# Patient Record
Sex: MALE | Race: BLACK OR AFRICAN AMERICAN | NOT HISPANIC OR LATINO | Employment: OTHER | URBAN - NONMETROPOLITAN AREA
[De-identification: names, ages, dates, MRNs, and addresses within clinical notes are randomized per-mention and may not be internally consistent; named-entity substitution may affect disease eponyms.]

---

## 2019-09-09 PROBLEM — C61 PROSTATE CANCER: Status: ACTIVE | Noted: 2019-09-09

## 2019-10-03 PROBLEM — R50.82 POST-PROCEDURAL FEVER: Status: ACTIVE | Noted: 2019-10-03

## 2019-10-03 PROBLEM — R61 DIAPHORESIS: Status: ACTIVE | Noted: 2019-10-03

## 2019-10-03 PROBLEM — R06.02 SHORTNESS OF BREATH: Status: ACTIVE | Noted: 2019-10-03

## 2019-10-05 PROBLEM — R10.9 ABDOMINAL PAIN: Status: ACTIVE | Noted: 2019-10-05

## 2019-10-07 PROBLEM — R63.8 INADEQUATE ORAL INTAKE: Status: ACTIVE | Noted: 2019-10-07

## 2019-10-09 PROBLEM — K56.7 ILEUS: Status: ACTIVE | Noted: 2019-10-09

## 2019-10-09 PROBLEM — D72.829 LEUKOCYTOSIS: Status: ACTIVE | Noted: 2019-10-09

## 2019-10-10 PROBLEM — K56.609 COLONIC OBSTRUCTION: Status: ACTIVE | Noted: 2019-10-10

## 2019-10-11 PROBLEM — D62 ACUTE BLOOD LOSS ANEMIA: Status: ACTIVE | Noted: 2019-10-11

## 2019-10-14 PROBLEM — E87.6 HYPOKALEMIA: Status: ACTIVE | Noted: 2019-10-14

## 2020-11-12 DIAGNOSIS — U07.1 COVID-19 VIRUS DETECTED: ICD-10-CM

## 2020-11-23 PROBLEM — Z12.11 SCREEN FOR COLON CANCER: Status: ACTIVE | Noted: 2020-11-23

## 2022-05-12 ENCOUNTER — HOSPITAL ENCOUNTER (EMERGENCY)
Facility: HOSPITAL | Age: 62
Discharge: HOME | End: 2022-05-12
Attending: EMERGENCY MEDICINE
Payer: COMMERCIAL

## 2022-05-12 VITALS
RESPIRATION RATE: 18 BRPM | DIASTOLIC BLOOD PRESSURE: 79 MMHG | SYSTOLIC BLOOD PRESSURE: 161 MMHG | OXYGEN SATURATION: 98 % | HEART RATE: 95 BPM | WEIGHT: 233 LBS | TEMPERATURE: 98 F | HEIGHT: 72 IN | BODY MASS INDEX: 31.56 KG/M2

## 2022-05-12 DIAGNOSIS — R31.0 GROSS HEMATURIA: Primary | ICD-10-CM

## 2022-05-12 LAB
BACTERIA #/AREA URNS HPF: ABNORMAL /HPF
BILIRUB UR QL STRIP: NEGATIVE
CLARITY UR: ABNORMAL
COLOR UR: ABNORMAL
GLUCOSE UR QL STRIP: NEGATIVE
HGB UR QL STRIP: ABNORMAL
HYALINE CASTS #/AREA URNS LPF: 0 /LPF
KETONES UR QL STRIP: ABNORMAL
LEUKOCYTE ESTERASE UR QL STRIP: ABNORMAL
MICROSCOPIC COMMENT: ABNORMAL
NITRITE UR QL STRIP: NEGATIVE
PH UR STRIP: 6 [PH] (ref 5–8)
PROT UR QL STRIP: ABNORMAL
RBC #/AREA URNS HPF: >100 /HPF (ref 0–4)
SP GR UR STRIP: 1.03 (ref 1–1.03)
SQUAMOUS #/AREA URNS HPF: 0 /HPF
URN SPEC COLLECT METH UR: ABNORMAL
UROBILINOGEN UR STRIP-ACNC: 1 EU/DL
WBC #/AREA URNS HPF: >100 /HPF (ref 0–5)

## 2022-05-12 PROCEDURE — 87086 URINE CULTURE/COLONY COUNT: CPT | Performed by: EMERGENCY MEDICINE

## 2022-05-12 PROCEDURE — 81000 URINALYSIS NONAUTO W/SCOPE: CPT | Performed by: EMERGENCY MEDICINE

## 2022-05-12 PROCEDURE — 99283 EMERGENCY DEPT VISIT LOW MDM: CPT | Mod: 25

## 2022-05-12 RX ORDER — LISINOPRIL 10 MG/1
10 TABLET ORAL DAILY
COMMUNITY
End: 2022-05-23

## 2022-05-12 NOTE — ED PROVIDER NOTES
EMERGENCY DEPARTMENT HISTORY AND PHYSICAL EXAM          Date: 5/12/2022   Patient Name: Yeyo Chandra       History of Presenting Illness           Chief Complaint   Patient presents with    Hematuria     Pt stated he has had several instances over the last few months of having blood in his urine. Pt advised he had his prostate removed 2 years ago due to cancer. Pt began urinating blood again last night. His PCP stated they could not find a reason.          History Provided By: Patient    0150   Yeyo Chandra is a 62 y.o. male with PMHX of hypertension, prostate cancer status post XRT and prostatectomy who presents to the emergency department C/O painless hematuria.    Patient reports gross hematuria that is intermittent.  States that happens about once a month.  Saw his urologist  about this couple years ago and resolved.  States when happened a few months ago he was diagnosed with a UTI however antibiotics and not really seem to resolve it.  States there is bloody clots in his urine.    Patient was previously on Eliquis for PE he is no longer taking it after it was discontinued by his physician    PCP: Nneka Willingham MD        No current facility-administered medications for this encounter.     Current Outpatient Medications   Medication Sig Dispense Refill    apixaban (ELIQUIS) 5 mg Tab Take 5 mg by mouth 2 (two) times daily.      lisinopriL 10 MG tablet Take 10 mg by mouth once daily.      lisinopriL-hydrochlorothiazide (PRINZIDE,ZESTORETIC) 20-12.5 mg per tablet Take 1 tablet by mouth once daily.      oxybutynin (DITROPAN-XL) 10 MG 24 hr tablet Take 10 mg by mouth once daily.       Facility-Administered Medications Ordered in Other Encounters   Medication Dose Route Frequency Provider Last Rate Last Admin    0.9%  NaCl infusion   Intravenous Continuous Ramirez Pappas MD   Stopped at 11/23/20 0848           Past History     Past Medical History:   Past Medical History:   Diagnosis Date    Anemia      Cancer     prostate    Colonic stricture     required colon decompression    Encounter for blood transfusion     Hypertension     Leukocytosis     Pulmonary embolism         Past Surgical History:   Past Surgical History:   Procedure Laterality Date    COLON DECOMPRESSION      COLONOSCOPY N/A 10/10/2019    Procedure: COLONOSCOPY WITH COLON DECOMPRESSION;  Surgeon: Ramirez aPppas MD;  [facility];  Service: Endoscopy;  Laterality: N/A;    COLONOSCOPY N/A 11/23/2020    Procedure: COLONOSCOPY;  Surgeon: Ramirez Pappas MD;  [facility];  Service: Endoscopy;  Laterality: N/A;    CYSTOGRAM N/A 10/4/2019    Procedure: CYSTOGRAM;  Surgeon: Devang Lewis MD;  [facility];  Service: Urology;  Laterality: N/A;    CYSTOSCOPY N/A 10/4/2019    Procedure: CYSTOSCOPY;  Surgeon: Devang Lewis MD;  [facility];  Service: Urology;  Laterality: N/A;    PERIPHERALLY INSERTED CENTRAL CATHETER INSERTION N/A 10/7/2019    Procedure: INSERTION, PICC;  Surgeon: Mariano Diagnostic Provider;  [facility];  Service: General;  Laterality: N/A;    RADICAL RETROPUBIC PROSTATECTOMY N/A 9/30/2019    Procedure: PROSTATECTOMY-RADICAL-RETROPUBIC using Harmonic scalpel;  Surgeon: Andrey Cummins MD;  [facility];  Service: Urology;  Laterality: N/A;    REMOVAL OF BLOOD CLOT N/A 10/4/2019    Procedure: REMOVAL, BLOOD CLOT;  Surgeon: Devang Lewis MD;  [facility];  Service: Urology;  Laterality: N/A;        Family History:   Family History   Problem Relation Age of Onset    Stroke Mother     Heart disease Father         Social History:   Social History     Tobacco Use    Smoking status: Never Smoker    Smokeless tobacco: Never Used   Substance Use Topics    Alcohol use: Yes    Drug use: Never        Allergies:   Review of patient's allergies indicates:  No Known Allergies       Review of Systems   Review of Systems   Constitutional: Negative for chills and fever.    Respiratory: Negative for shortness of breath.    Gastrointestinal: Negative for abdominal pain, nausea and vomiting.   Genitourinary: Positive for hematuria. Negative for decreased urine volume, dysuria, flank pain, penile swelling, scrotal swelling and urgency.   All other systems reviewed and are negative.               Physical Exam     Vitals:    05/12/22 0122 05/12/22 0123   BP: (!) 161/79    BP Location: Left arm    Patient Position: Sitting    Pulse: 95    Resp: 18    Temp: 98.2 °F (36.8 °C)    TempSrc: Oral    SpO2: 98%    Weight:  105.7 kg (233 lb)   Height:  6' (1.829 m)      Physical Exam  Vitals and nursing note reviewed.   Constitutional:       General: He is not in acute distress.     Appearance: Normal appearance. He is well-developed. He is not ill-appearing.   HENT:      Head: Normocephalic and atraumatic.      Nose: No congestion or rhinorrhea.   Eyes:      Conjunctiva/sclera: Conjunctivae normal.      Pupils: Pupils are equal, round, and reactive to light.   Cardiovascular:      Rate and Rhythm: Regular rhythm.   Pulmonary:      Effort: Pulmonary effort is normal. No respiratory distress.   Musculoskeletal:         General: No deformity or signs of injury. Normal range of motion.      Cervical back: Normal range of motion and neck supple.   Skin:     General: Skin is warm and dry.      Coloration: Skin is not pale.      Findings: No lesion.   Neurological:      General: No focal deficit present.      Mental Status: He is alert and oriented to person, place, and time.      Cranial Nerves: No cranial nerve deficit.      Sensory: No sensory deficit.      Motor: No weakness.   Psychiatric:         Mood and Affect: Mood normal.         Behavior: Behavior normal.              Diagnostic Study Results      Labs -   Recent Results (from the past 12 hour(s))   Urinalysis, Reflex to Urine Culture Urine, Clean Catch    Collection Time: 05/12/22  1:23 AM    Specimen: Urine, Clean Catch   Result Value Ref  Range    Specimen UA Urine, Clean Catch     Color, UA Red (A) Yellow, Straw, Norma    Appearance, UA Cloudy (A) Clear    pH, UA 6.0 5.0 - 8.0    Specific Gravity, UA 1.030 1.005 - 1.030    Protein, UA 3+ (A) Negative    Glucose, UA Negative Negative    Ketones, UA Trace (A) Negative    Bilirubin (UA) Negative Negative    Occult Blood UA 3+ (A) Negative    Nitrite, UA Negative Negative    Urobilinogen, UA 1.0 <2.0 EU/dL    Leukocytes, UA 3+ (A) Negative   Urinalysis Microscopic    Collection Time: 05/12/22  1:23 AM   Result Value Ref Range    RBC, UA >100 (H) 0 - 4 /hpf    WBC, UA >100 (H) 0 - 5 /hpf    Bacteria Rare None-Occ /hpf    Squam Epithel, UA 0 /hpf    Hyaline Casts, UA 0 0-1/lpf /lpf    Microscopic Comment SEE COMMENT         Radiologic Studies -    No orders to display        Medications given in the ED-   Medications - No data to display        Medical Decision Making    I am the first provider for this patient.     I reviewed the vital signs, available nursing notes, past medical history, past surgical history, family history and social history.     Vital Signs:  Reviewed the patient's vital signs.     Pulse Oximetry Analysis and Interpretation:    98% on Room Air, normal      Records Reviewed: Nursing notes.        Provider Notes (Medical Decision Making): Yeyo Chandra is a 62 y.o. male here with hematuria this is an acute on chronic problem.      Patient has prior history of malignancy of his prostate without metastasis.  Denies chemical exposures.  Also received XRT to radiation.  Differential includes infectious etiology versus radiation-induced versus malignancy    Procedures:   ED US LTD Renal/Bladder    Date/Time: 5/12/2022 1:53 AM  Performed by: Myron Donovan MD  Authorized by: Myron Donovan MD     Indication:  Hematuria  Identified Structures:  Right kidney longitudinal, Right kidney transverse, Left kidney longitudinal, Left kidney transverse and Bladder  Hydronephrosis:   Absent  Cyst:  Absent  Hydronephrosis:  Absent  Cyst:  Absent  Vesicular calculi:  Absent  Other:  Compressed bladder, no mass or wall thickening appreciated  Impression:  Normal limited renal ultrasound  Charge?:  No (educational)          ED Course:    Urine demonstrates large amount of rbc's and wbc's.  No nitrates.  Will send urine culture.  Low suspicion for UTI as patient is pain free.  Advised follow-up with Urology.           Diagnosis and Disposition     Critical Care:      DISCHARGE NOTE:       Yeyo Chandra's  results have been reviewed with him.  He has been counseled regarding his diagnosis, treatment, and plan.  He verbally conveys understanding and agreement of the signs, symptoms, diagnosis, treatment and prognosis and additionally agrees to follow up as discussed.  He also agrees with the care-plan and conveys that all of his questions have been answered.  I have also provided discharge instructions for him that include: educational information regarding their diagnosis and treatment, and list of reasons why they would want to return to the ED prior to their follow-up appointment, should his condition change. He has been provided with education for proper emergency department utilization.         CLINICAL IMPRESSION:         1. Gross hematuria              PLAN:   1. Discharge Home  2.      Medication List      ASK your doctor about these medications    apixaban 5 mg Tab  Commonly known as: ELIQUIS     lisinopriL 10 MG tablet     lisinopriL-hydrochlorothiazide 20-12.5 mg per tablet  Commonly known as: PRINZIDE,ZESTORETIC     oxybutynin 10 MG 24 hr tablet  Commonly known as: DITROPAN-XL           3. Nneka Willingham MD  [address] [address] [phone]    Schedule an appointment as soon as possible for a visit   Primary care follow up    Andrey Cummins MD  [address] [place] [id number] [phone]    Schedule an appointment as soon as  possible for a visit          _______________________________     Please note that this dictation was completed with M*[employer]v, the computer voice recognition software.  Quite often unanticipated grammatical, syntax, homophones, and other interpretive errors are inadvertently transcribed by the computer software.  Please disregard these errors.  Please excuse any errors that have escaped final proofreading.             Myron Donovan MD  05/12/22 0208

## 2022-05-14 LAB — BACTERIA UR CULT: NO GROWTH

## 2023-04-04 ENCOUNTER — PATIENT MESSAGE (OUTPATIENT)
Dept: RESEARCH | Facility: HOSPITAL | Age: 63
End: 2023-04-04
Payer: COMMERCIAL